# Patient Record
Sex: MALE | Race: BLACK OR AFRICAN AMERICAN | NOT HISPANIC OR LATINO
[De-identification: names, ages, dates, MRNs, and addresses within clinical notes are randomized per-mention and may not be internally consistent; named-entity substitution may affect disease eponyms.]

---

## 2021-05-24 ENCOUNTER — NON-APPOINTMENT (OUTPATIENT)
Age: 66
End: 2021-05-24

## 2021-05-24 ENCOUNTER — APPOINTMENT (OUTPATIENT)
Dept: OPHTHALMOLOGY | Facility: CLINIC | Age: 66
End: 2021-05-24
Payer: MEDICARE

## 2021-05-24 PROBLEM — Z00.00 ENCOUNTER FOR PREVENTIVE HEALTH EXAMINATION: Status: ACTIVE | Noted: 2021-05-24

## 2021-05-24 PROCEDURE — 99072 ADDL SUPL MATRL&STAF TM PHE: CPT

## 2021-05-24 PROCEDURE — 76514 ECHO EXAM OF EYE THICKNESS: CPT

## 2021-05-24 PROCEDURE — 92250 FUNDUS PHOTOGRAPHY W/I&R: CPT

## 2021-05-24 PROCEDURE — 92004 COMPRE OPH EXAM NEW PT 1/>: CPT

## 2021-06-14 ENCOUNTER — NON-APPOINTMENT (OUTPATIENT)
Age: 66
End: 2021-06-14

## 2021-06-14 ENCOUNTER — APPOINTMENT (OUTPATIENT)
Dept: OPHTHALMOLOGY | Facility: CLINIC | Age: 66
End: 2021-06-14
Payer: MEDICARE

## 2021-06-14 PROCEDURE — 92083 EXTENDED VISUAL FIELD XM: CPT

## 2021-06-14 PROCEDURE — 99072 ADDL SUPL MATRL&STAF TM PHE: CPT

## 2021-06-14 PROCEDURE — 92012 INTRM OPH EXAM EST PATIENT: CPT

## 2021-07-26 ENCOUNTER — APPOINTMENT (OUTPATIENT)
Dept: OPHTHALMOLOGY | Facility: CLINIC | Age: 66
End: 2021-07-26
Payer: MEDICARE

## 2021-07-26 ENCOUNTER — NON-APPOINTMENT (OUTPATIENT)
Age: 66
End: 2021-07-26

## 2021-07-26 PROCEDURE — 92012 INTRM OPH EXAM EST PATIENT: CPT

## 2021-07-26 PROCEDURE — 92134 CPTRZ OPH DX IMG PST SGM RTA: CPT

## 2021-10-11 ENCOUNTER — NON-APPOINTMENT (OUTPATIENT)
Age: 66
End: 2021-10-11

## 2021-10-11 ENCOUNTER — APPOINTMENT (OUTPATIENT)
Dept: OPHTHALMOLOGY | Facility: CLINIC | Age: 66
End: 2021-10-11
Payer: MEDICARE

## 2021-10-11 PROCEDURE — 92012 INTRM OPH EXAM EST PATIENT: CPT

## 2021-10-11 PROCEDURE — 92083 EXTENDED VISUAL FIELD XM: CPT

## 2021-10-11 PROCEDURE — 92133 CPTRZD OPH DX IMG PST SGM ON: CPT

## 2021-11-15 ENCOUNTER — NON-APPOINTMENT (OUTPATIENT)
Age: 66
End: 2021-11-15

## 2021-11-15 ENCOUNTER — APPOINTMENT (OUTPATIENT)
Dept: OPHTHALMOLOGY | Facility: CLINIC | Age: 66
End: 2021-11-15
Payer: MEDICARE

## 2021-11-15 PROCEDURE — 92012 INTRM OPH EXAM EST PATIENT: CPT

## 2021-11-29 ENCOUNTER — TRANSCRIPTION ENCOUNTER (OUTPATIENT)
Age: 66
End: 2021-11-29

## 2021-11-30 ENCOUNTER — APPOINTMENT (OUTPATIENT)
Dept: OPHTHALMOLOGY | Facility: AMBULATORY SURGERY CENTER | Age: 66
End: 2021-11-30

## 2021-11-30 ENCOUNTER — NON-APPOINTMENT (OUTPATIENT)
Age: 66
End: 2021-11-30

## 2021-11-30 ENCOUNTER — OUTPATIENT (OUTPATIENT)
Dept: OUTPATIENT SERVICES | Facility: HOSPITAL | Age: 66
LOS: 1 days | Discharge: HOME | End: 2021-11-30
Payer: MEDICARE

## 2021-11-30 PROCEDURE — 66170 GLAUCOMA SURGERY: CPT

## 2021-12-01 ENCOUNTER — NON-APPOINTMENT (OUTPATIENT)
Age: 66
End: 2021-12-01

## 2021-12-01 ENCOUNTER — APPOINTMENT (OUTPATIENT)
Dept: OPHTHALMOLOGY | Facility: CLINIC | Age: 66
End: 2021-12-01
Payer: MEDICARE

## 2021-12-01 PROCEDURE — 99024 POSTOP FOLLOW-UP VISIT: CPT

## 2021-12-06 ENCOUNTER — APPOINTMENT (OUTPATIENT)
Dept: OPHTHALMOLOGY | Facility: CLINIC | Age: 66
End: 2021-12-06
Payer: MEDICARE

## 2021-12-06 ENCOUNTER — NON-APPOINTMENT (OUTPATIENT)
Age: 66
End: 2021-12-06

## 2021-12-06 PROCEDURE — 99024 POSTOP FOLLOW-UP VISIT: CPT

## 2021-12-13 ENCOUNTER — APPOINTMENT (OUTPATIENT)
Dept: OPHTHALMOLOGY | Facility: CLINIC | Age: 66
End: 2021-12-13
Payer: MEDICARE

## 2021-12-13 ENCOUNTER — NON-APPOINTMENT (OUTPATIENT)
Age: 66
End: 2021-12-13

## 2021-12-13 PROCEDURE — 99024 POSTOP FOLLOW-UP VISIT: CPT

## 2021-12-20 ENCOUNTER — NON-APPOINTMENT (OUTPATIENT)
Age: 66
End: 2021-12-20

## 2021-12-20 ENCOUNTER — APPOINTMENT (OUTPATIENT)
Dept: OPHTHALMOLOGY | Facility: CLINIC | Age: 66
End: 2021-12-20
Payer: MEDICARE

## 2021-12-20 PROCEDURE — 99024 POSTOP FOLLOW-UP VISIT: CPT

## 2022-01-03 ENCOUNTER — NON-APPOINTMENT (OUTPATIENT)
Age: 67
End: 2022-01-03

## 2022-01-03 ENCOUNTER — APPOINTMENT (OUTPATIENT)
Dept: OPHTHALMOLOGY | Facility: CLINIC | Age: 67
End: 2022-01-03
Payer: MEDICARE

## 2022-01-03 PROCEDURE — 99024 POSTOP FOLLOW-UP VISIT: CPT

## 2022-01-24 ENCOUNTER — NON-APPOINTMENT (OUTPATIENT)
Age: 67
End: 2022-01-24

## 2022-01-24 ENCOUNTER — APPOINTMENT (OUTPATIENT)
Dept: OPHTHALMOLOGY | Facility: CLINIC | Age: 67
End: 2022-01-24
Payer: MEDICARE

## 2022-01-24 PROCEDURE — 99024 POSTOP FOLLOW-UP VISIT: CPT

## 2022-02-14 ENCOUNTER — NON-APPOINTMENT (OUTPATIENT)
Age: 67
End: 2022-02-14

## 2022-02-14 ENCOUNTER — APPOINTMENT (OUTPATIENT)
Dept: OPHTHALMOLOGY | Facility: CLINIC | Age: 67
End: 2022-02-14
Payer: MEDICARE

## 2022-02-14 PROCEDURE — 99024 POSTOP FOLLOW-UP VISIT: CPT

## 2022-05-16 ENCOUNTER — APPOINTMENT (OUTPATIENT)
Dept: OPHTHALMOLOGY | Facility: CLINIC | Age: 67
End: 2022-05-16
Payer: MEDICARE

## 2022-05-16 ENCOUNTER — NON-APPOINTMENT (OUTPATIENT)
Age: 67
End: 2022-05-16

## 2022-05-16 PROCEDURE — 92012 INTRM OPH EXAM EST PATIENT: CPT

## 2022-05-16 PROCEDURE — 92083 EXTENDED VISUAL FIELD XM: CPT

## 2022-05-17 NOTE — ASU PATIENT PROFILE, ADULT - PATIENT REPRESENTATIVE: ( YOU CAN CHOOSE ANY PERSON THAT CAN ASSIST YOU WITH YOUR HEALTH CARE PREFERENCES, DOES NOT HAVE TO BE A SPOUSE, IMMEDIATE FAMILY OR SIGNIFICANT OTHER/PARTNER)
Pt arrives in care of parents for n/v. Onset approx 2200 tonight. Pt sleeping on arrival to triage however awakened during process dry heaving. Mother denies diarrhea or fever. Mother attempted tums without relief. Declines

## 2022-05-17 NOTE — ASU PATIENT PROFILE, ADULT - NSICDXPASTSURGICALHX_GEN_ALL_CORE_FT
PAST SURGICAL HISTORY:  H/O resection of stomach      PAST SURGICAL HISTORY:  Cataract, right eye     H/O resection of stomach     History of trabeculectomy left eye

## 2022-05-17 NOTE — ASU PATIENT PROFILE, ADULT - NS PREOP UNDERSTANDS INFO
2 slices of dry toast before 6am, water or clear apple juice until 11:15am, Patient verbalized understanding.

## 2022-05-17 NOTE — ASU PATIENT PROFILE, ADULT - NSICDXPASTMEDICALHX_GEN_ALL_CORE_FT
PAST MEDICAL HISTORY:  H pylori ulcer     HTN (hypertension)     Hyperlipidemia      PAST MEDICAL HISTORY:  Enlarged prostate     H pylori ulcer     HTN (hypertension)     Hyperlipidemia

## 2022-05-18 ENCOUNTER — OUTPATIENT (OUTPATIENT)
Dept: OUTPATIENT SERVICES | Facility: HOSPITAL | Age: 67
LOS: 1 days | Discharge: HOME | End: 2022-05-18
Payer: MEDICARE

## 2022-05-18 ENCOUNTER — APPOINTMENT (OUTPATIENT)
Dept: OPHTHALMOLOGY | Facility: AMBULATORY SURGERY CENTER | Age: 67
End: 2022-05-18

## 2022-05-18 ENCOUNTER — NON-APPOINTMENT (OUTPATIENT)
Age: 67
End: 2022-05-18

## 2022-05-18 VITALS
HEART RATE: 57 BPM | HEIGHT: 66 IN | SYSTOLIC BLOOD PRESSURE: 138 MMHG | WEIGHT: 160.06 LBS | DIASTOLIC BLOOD PRESSURE: 91 MMHG | OXYGEN SATURATION: 100 % | TEMPERATURE: 98 F | RESPIRATION RATE: 16 BRPM

## 2022-05-18 VITALS
HEART RATE: 57 BPM | RESPIRATION RATE: 18 BRPM | OXYGEN SATURATION: 98 % | SYSTOLIC BLOOD PRESSURE: 134 MMHG | DIASTOLIC BLOOD PRESSURE: 82 MMHG

## 2022-05-18 DIAGNOSIS — Z98.890 OTHER SPECIFIED POSTPROCEDURAL STATES: Chronic | ICD-10-CM

## 2022-05-18 DIAGNOSIS — H26.9 UNSPECIFIED CATARACT: Chronic | ICD-10-CM

## 2022-05-18 DIAGNOSIS — Z90.3 ACQUIRED ABSENCE OF STOMACH [PART OF]: Chronic | ICD-10-CM

## 2022-05-18 PROCEDURE — 66250 FOLLOW-UP SURGERY OF EYE: CPT | Mod: LT

## 2022-05-18 RX ORDER — ACETAMINOPHEN 500 MG
650 TABLET ORAL ONCE
Refills: 0 | Status: DISCONTINUED | OUTPATIENT
Start: 2022-05-18 | End: 2022-05-18

## 2022-05-18 RX ORDER — ONDANSETRON 8 MG/1
4 TABLET, FILM COATED ORAL ONCE
Refills: 0 | Status: DISCONTINUED | OUTPATIENT
Start: 2022-05-18 | End: 2022-05-18

## 2022-05-18 RX ORDER — AMLODIPINE BESYLATE 2.5 MG/1
1 TABLET ORAL
Qty: 0 | Refills: 0 | DISCHARGE

## 2022-05-18 RX ORDER — SODIUM CHLORIDE 9 MG/ML
1000 INJECTION, SOLUTION INTRAVENOUS
Refills: 0 | Status: DISCONTINUED | OUTPATIENT
Start: 2022-05-18 | End: 2022-05-18

## 2022-05-18 RX ORDER — OFLOXACIN 0.3 %
1 DROPS OPHTHALMIC (EYE)
Refills: 0 | Status: COMPLETED | OUTPATIENT
Start: 2022-05-18 | End: 2022-05-18

## 2022-05-18 RX ADMIN — Medication 1 DROP(S): at 14:05

## 2022-05-18 RX ADMIN — Medication 1 DROP(S): at 14:10

## 2022-05-18 RX ADMIN — Medication 1 DROP(S): at 14:00

## 2022-05-18 NOTE — OPERATIVE REPORT - OPERATIVE RPOSRT DETAILS
DATE OF SURGERY: 05/18/2022    OPERATING SURGEON: BEATRIZ THOMPSON D.O.     ASSISTANT SURGEON: NONE     ANESTHESIA: MONITORED ANESTHESIA CARE    PREOPERATIVE DIAGNOSIS: SEVERE PRIMARY OPEN ANGLE GLAUCOMA, LEFT EYE    POSTOPERATIVE DIAGNOSIS: SEVERE PRIMARY OPEN ANGLE GLAUCOMA, LEFT EYE    OPERATIVE PROCEDURE: TRABECULECTOMY REVISION WITH MITOMYCIN C, LEFT EYE    COMPLICATIONS: NONE.     SPECIMENS: NONE.    ESTIMATED BLOOD LOSS: <1 cc    PATIENT CONDITION: STABLE.    PROCEDURE:  Prior to the procedure, all risks, benefits and alternatives were discussed with the patient, including but not limited to infection, bleeding, retinal detachment, increase or decrease in intraocular pressure, corneal edema, corneal decompensation, ptosis, diplopia, loss of vision, no improvement of vision, need for second surgery, macular edema, intraocular inflammation, etc. All questions were answered and the patient wished to proceed with the surgery. Informed consent was obtained.    The patient was wheeled to the operating room and placed on the operating table in a supine position. Next, the left eye was prepped and draped in the usual sterile fashion for intraocular surgery. An eyelid speculum was placed into the left eye.    A 7-0 silk traction suture was placed through the cornea and the eye was rotated inferiorly. Subconjunctival and subtenon 2% PF Lidocaine and Mitomycin-C 30 micrograms were then injected into the superior quadrant. 1-mm Side port blade was used to create a paracenthesis temporally. Next, MVR blade and 27 gauge needle were used for transconjunctival needle revision from superonasal approach. The 'ring of steel' was penetrated and adhesions broken. The needle was then advanced underneath the scleral flap into the ostium. Increased flow was noted. BSS was injected into the anterior chamber to raise the intraocular pressure. Single 8-0 vicryl suture was then used to close the conjunctiva. The intraocular pressure was manually palpated and found to be around 10mmHg.     At the cessation of the procedure, the anterior chamber was well formed with a diffuse filtering bleb superiorly. All wounds were inspected meticulously and found to be watertight. Antibiotic and dexamethasone were applied on the eye. Eyelid speculum was removed. Topical antibiotic and steroid ointment was placed onto the left eye, which was then patched and shielded. The patient left the operating room in an excellent condition.

## 2022-05-19 ENCOUNTER — NON-APPOINTMENT (OUTPATIENT)
Age: 67
End: 2022-05-19

## 2022-05-19 ENCOUNTER — APPOINTMENT (OUTPATIENT)
Dept: OPHTHALMOLOGY | Facility: CLINIC | Age: 67
End: 2022-05-19
Payer: MEDICARE

## 2022-05-19 ENCOUNTER — TRANSCRIPTION ENCOUNTER (OUTPATIENT)
Age: 67
End: 2022-05-19

## 2022-05-19 PROBLEM — K27.9 PEPTIC ULCER, SITE UNSPECIFIED, UNSPECIFIED AS ACUTE OR CHRONIC, WITHOUT HEMORRHAGE OR PERFORATION: Chronic | Status: ACTIVE | Noted: 2022-05-17

## 2022-05-19 PROBLEM — N40.0 BENIGN PROSTATIC HYPERPLASIA WITHOUT LOWER URINARY TRACT SYMPTOMS: Chronic | Status: ACTIVE | Noted: 2022-05-17

## 2022-05-19 PROBLEM — I10 ESSENTIAL (PRIMARY) HYPERTENSION: Chronic | Status: ACTIVE | Noted: 2022-05-17

## 2022-05-19 PROBLEM — E78.5 HYPERLIPIDEMIA, UNSPECIFIED: Chronic | Status: ACTIVE | Noted: 2022-05-17

## 2022-05-19 PROCEDURE — 99024 POSTOP FOLLOW-UP VISIT: CPT

## 2022-05-23 ENCOUNTER — NON-APPOINTMENT (OUTPATIENT)
Age: 67
End: 2022-05-23

## 2022-05-23 ENCOUNTER — APPOINTMENT (OUTPATIENT)
Dept: OPHTHALMOLOGY | Facility: CLINIC | Age: 67
End: 2022-05-23
Payer: MEDICARE

## 2022-05-23 PROCEDURE — 99024 POSTOP FOLLOW-UP VISIT: CPT

## 2022-05-31 ENCOUNTER — APPOINTMENT (OUTPATIENT)
Dept: OPHTHALMOLOGY | Facility: CLINIC | Age: 67
End: 2022-05-31
Payer: MEDICARE

## 2022-05-31 ENCOUNTER — NON-APPOINTMENT (OUTPATIENT)
Age: 67
End: 2022-05-31

## 2022-05-31 PROCEDURE — 68200 TREAT EYELID BY INJECTION: CPT | Mod: 58,LT

## 2022-05-31 PROCEDURE — 99024 POSTOP FOLLOW-UP VISIT: CPT

## 2022-06-07 ENCOUNTER — NON-APPOINTMENT (OUTPATIENT)
Age: 67
End: 2022-06-07

## 2022-06-07 ENCOUNTER — APPOINTMENT (OUTPATIENT)
Dept: OPHTHALMOLOGY | Facility: CLINIC | Age: 67
End: 2022-06-07
Payer: MEDICARE

## 2022-06-07 PROCEDURE — 99024 POSTOP FOLLOW-UP VISIT: CPT

## 2022-06-13 ENCOUNTER — NON-APPOINTMENT (OUTPATIENT)
Age: 67
End: 2022-06-13

## 2022-06-13 ENCOUNTER — APPOINTMENT (OUTPATIENT)
Dept: OPHTHALMOLOGY | Facility: CLINIC | Age: 67
End: 2022-06-13
Payer: MEDICARE

## 2022-06-13 PROCEDURE — 99024 POSTOP FOLLOW-UP VISIT: CPT

## 2022-06-27 ENCOUNTER — NON-APPOINTMENT (OUTPATIENT)
Age: 67
End: 2022-06-27

## 2022-06-27 ENCOUNTER — APPOINTMENT (OUTPATIENT)
Dept: OPHTHALMOLOGY | Facility: CLINIC | Age: 67
End: 2022-06-27

## 2022-06-27 PROCEDURE — 99024 POSTOP FOLLOW-UP VISIT: CPT

## 2022-06-27 PROCEDURE — 68200 TREAT EYELID BY INJECTION: CPT | Mod: 58,LT

## 2022-07-18 ENCOUNTER — NON-APPOINTMENT (OUTPATIENT)
Age: 67
End: 2022-07-18

## 2022-07-18 ENCOUNTER — APPOINTMENT (OUTPATIENT)
Dept: OPHTHALMOLOGY | Facility: CLINIC | Age: 67
End: 2022-07-18

## 2022-07-18 PROCEDURE — 99024 POSTOP FOLLOW-UP VISIT: CPT

## 2022-08-01 ENCOUNTER — NON-APPOINTMENT (OUTPATIENT)
Age: 67
End: 2022-08-01

## 2022-08-01 ENCOUNTER — APPOINTMENT (OUTPATIENT)
Dept: OPHTHALMOLOGY | Facility: CLINIC | Age: 67
End: 2022-08-01

## 2022-08-01 PROCEDURE — 99024 POSTOP FOLLOW-UP VISIT: CPT

## 2022-08-29 NOTE — ASU PATIENT PROFILE, ADULT - NSICDXPASTSURGICALHX_GEN_ALL_CORE_FT
PAST SURGICAL HISTORY:  Cataract, right eye     H/O resection of stomach     History of trabeculectomy left eye

## 2022-08-29 NOTE — ASU PATIENT PROFILE, ADULT - FALL HARM RISK - UNIVERSAL INTERVENTIONS
Bed in lowest position, wheels locked, appropriate side rails in place/Call bell, personal items and telephone in reach/Instruct patient to call for assistance before getting out of bed or chair/Non-slip footwear when patient is out of bed/[place]i to call system/Physically safe environment - no spills, clutter or unnecessary equipment/Purposeful Proactive Rounding/Room/bathroom lighting operational, light cord in reach

## 2022-08-29 NOTE — ASU PATIENT PROFILE, ADULT - NSICDXPASTMEDICALHX_GEN_ALL_CORE_FT
PAST MEDICAL HISTORY:  Enlarged prostate     H pylori ulcer     HTN (hypertension)     Hyperlipidemia

## 2022-08-29 NOTE — ASU PATIENT PROFILE, ADULT - NS PREOP UNDERSTANDS INFO
NPO after 12Mn escort arranged, bring ID photo , insurance and vaccination cards, address and telephone given to patient./yes

## 2022-08-30 ENCOUNTER — APPOINTMENT (OUTPATIENT)
Dept: OPHTHALMOLOGY | Facility: AMBULATORY SURGERY CENTER | Age: 67
End: 2022-08-30

## 2022-08-31 ENCOUNTER — APPOINTMENT (OUTPATIENT)
Dept: OPHTHALMOLOGY | Facility: CLINIC | Age: 67
End: 2022-08-31

## 2022-09-13 ENCOUNTER — TRANSCRIPTION ENCOUNTER (OUTPATIENT)
Age: 67
End: 2022-09-13

## 2022-09-13 ENCOUNTER — NON-APPOINTMENT (OUTPATIENT)
Age: 67
End: 2022-09-13

## 2022-09-13 ENCOUNTER — OUTPATIENT (OUTPATIENT)
Dept: OUTPATIENT SERVICES | Facility: HOSPITAL | Age: 67
LOS: 1 days | Discharge: HOME | End: 2022-09-13

## 2022-09-13 ENCOUNTER — APPOINTMENT (OUTPATIENT)
Dept: OPHTHALMOLOGY | Facility: AMBULATORY SURGERY CENTER | Age: 67
End: 2022-09-13

## 2022-09-13 VITALS
RESPIRATION RATE: 16 BRPM | TEMPERATURE: 97 F | DIASTOLIC BLOOD PRESSURE: 76 MMHG | OXYGEN SATURATION: 99 % | SYSTOLIC BLOOD PRESSURE: 137 MMHG | HEART RATE: 61 BPM

## 2022-09-13 VITALS
WEIGHT: 158.07 LBS | TEMPERATURE: 97 F | DIASTOLIC BLOOD PRESSURE: 90 MMHG | RESPIRATION RATE: 16 BRPM | HEART RATE: 76 BPM | HEIGHT: 66 IN | SYSTOLIC BLOOD PRESSURE: 144 MMHG | OXYGEN SATURATION: 99 %

## 2022-09-13 DIAGNOSIS — H26.9 UNSPECIFIED CATARACT: Chronic | ICD-10-CM

## 2022-09-13 DIAGNOSIS — Z98.890 OTHER SPECIFIED POSTPROCEDURAL STATES: Chronic | ICD-10-CM

## 2022-09-13 DIAGNOSIS — Z90.3 ACQUIRED ABSENCE OF STOMACH [PART OF]: Chronic | ICD-10-CM

## 2022-09-13 PROCEDURE — 66180 AQUEOUS SHUNT EYE W/GRAFT: CPT | Mod: LT

## 2022-09-13 DEVICE — SHUNT GLAUCOMA BAERVELDT: Type: IMPLANTABLE DEVICE | Site: LEFT | Status: FUNCTIONAL

## 2022-09-13 RX ORDER — PITAVASTATIN CALCIUM 1.04 MG/1
1 TABLET, FILM COATED ORAL
Qty: 0 | Refills: 0 | DISCHARGE

## 2022-09-13 RX ORDER — SODIUM CHLORIDE 9 MG/ML
1000 INJECTION, SOLUTION INTRAVENOUS
Refills: 0 | Status: DISCONTINUED | OUTPATIENT
Start: 2022-09-13 | End: 2022-09-13

## 2022-09-13 RX ORDER — LOSARTAN POTASSIUM 100 MG/1
1 TABLET, FILM COATED ORAL
Qty: 0 | Refills: 0 | DISCHARGE

## 2022-09-13 RX ORDER — OFLOXACIN 0.3 %
1 DROPS OPHTHALMIC (EYE)
Refills: 0 | Status: COMPLETED | OUTPATIENT
Start: 2022-09-13 | End: 2022-09-13

## 2022-09-13 RX ORDER — OFLOXACIN 0.3 %
1 DROPS OPHTHALMIC (EYE)
Refills: 0 | Status: DISCONTINUED | OUTPATIENT
Start: 2022-09-13 | End: 2022-09-13

## 2022-09-13 RX ORDER — CHOLECALCIFEROL (VITAMIN D3) 125 MCG
1 CAPSULE ORAL
Qty: 0 | Refills: 0 | DISCHARGE

## 2022-09-13 RX ORDER — FINASTERIDE 5 MG/1
1 TABLET, FILM COATED ORAL
Qty: 0 | Refills: 0 | DISCHARGE

## 2022-09-13 RX ADMIN — Medication 1 DROP(S): at 13:01

## 2022-09-13 RX ADMIN — Medication 1 DROP(S): at 12:53

## 2022-09-13 RX ADMIN — Medication 1 DROP(S): at 13:14

## 2022-09-13 NOTE — OPERATIVE REPORT - OPERATIVE RPOSRT DETAILS
DATE OF SURGERY:     OPERATING SURGEON: BEATRIZ THOMPSON D.O.     ASSISTANT SURGEON:     ANESTHESIA: MONITORED ANESTHESIA CARE AND SUBCONJUNCTIVAL INJECTION.    PREOPERATIVE DIAGNOSIS: GLAUCOMA, LEFT EYE    POSTOPERATIVE DIAGNOSIS: GLAUCOMA, LEFT EYE    OPERATIVE PROCEDURE: BAERVELDT GLAUCOMA IMPLANT, CORNEAL PATCH GRAFT, LEFT EYE.    COMPLICATIONS: NONE.    SPECIMEN: NONE.    ESTIMATED BLOOD LOSS: <1 cc    PATIENT CONDITION: STABLE.    PROCEDURE:   Prior to the procedure, all risks, benefits and alternatives were discussed with the patient, including but not limited to infection, bleeding, retinal detachment, increase or decrease in intraocular pressure, corneal edema, corneal decompensation, ptosis, diplopia, loss of vision, no improvement of vision, need for second surgery, macular edema, intraocular inflammation, etc. All questions were answered and the patient wished to proceed with the surgery. Informed consent was obtained.    The patient was wheeled to the operating room and placed on the operating table in a supine position. Next, the left eye was prepped and draped in the usual sterile fashion for intraocular surgery. An eyelid speculum was placed into the left eye.    A 7-0 silk traction suture was placed through the cornea and the eye was rotated superiorly. Subconjunctival and subtenon lidocaine was then injected into the inferonasal quadrant. An inferonasal peritomy was then created with Deborah scissors and clot forceps. The area was carefully dissected posteriorly. A Baerveldt glaucoma implant 350 was primed with balanced salt solution. One 3-0 Prolene suture and one 6-0 Prolene suture were inserted as rip cords into the proximal opening of the tube. The tube was ligated tightly together with the two rip cord sutures, using two 7-0 Vicryl sutures. Inferior and nasal rectus muscles were isolated using a muscle hook and a cotton-tipped applicator. The plate was then placed 8 mm posterior to the limbus and sutured down with two interrupted 8-0 Nylon sutures.    The tube was then trimmed to an appropriate length. Using a 23 gauge needle, a sclerostomy was created with an external opening at 2 mm posterior to the limbus. The tube was then placed into the anterior chamber through the sclerostomy. It was noted to be in a good position. The tube was then sutured to the sclera using three interrupted 9-0 Nylon sutures. A corneal patch graft was sutured over the tube with two interrupted 8-0 Vicryl sutures. A 10-0 Vicryl suture was passed across the tube for intraocular pressure control during the early postoperative period. After appropriate conjunctivoplasty, the conjunctiva was then reapproximated with interrupted 8-0 Vicryl sutures. Mitomycin C 10 micrograms was injected into the Tenon’s capsule over the tube plate.    At the end of the procedure, the anterior chamber was well formed. The intraocular pressure was in the mid-teens by palpation. The tube was in a good position and the conjunctival wound was water tight. Antibiotic and dexamethasone were applied on the cornea and conjunctiva. The eyelid speculum was removed. Topical antibiotic and steroid ointment was placed onto the left eye, which was then patched and shielded. The patient was wheeled to the recovery room in a stable and excellent condition.   DATE OF SURGERY: 09/13/2022    OPERATING SURGEON: BEATRIZ THOMPSON D.O.     ASSISTANT SURGEON: NONE    ANESTHESIA: MONITORED ANESTHESIA CARE AND SUBCONJUNCTIVAL INJECTION.    PREOPERATIVE DIAGNOSIS: SEVERE PRIMARY OPEN-ANGLE GLAUCOMA, LEFT EYE    POSTOPERATIVE DIAGNOSIS: SEVERE PRIMARY OPEN-ANGLE GLAUCOMA, LEFT EYE    OPERATIVE PROCEDURE: BAERVELDT GLAUCOMA IMPLANT, CORNEAL PATCH GRAFT, LEFT EYE.    COMPLICATIONS: NONE.    SPECIMEN: NONE.    ESTIMATED BLOOD LOSS: <1 cc    PATIENT CONDITION: STABLE.    PROCEDURE:   Prior to the procedure, all risks, benefits and alternatives were discussed with the patient, including but not limited to infection, bleeding, retinal detachment, increase or decrease in intraocular pressure, corneal edema, corneal decompensation, ptosis, diplopia, loss of vision, no improvement of vision, need for second surgery, macular edema, intraocular inflammation, etc. All questions were answered and the patient wished to proceed with the surgery. Informed consent was obtained.    The patient was wheeled to the operating room and placed on the operating table in a supine position. Next, the left eye was prepped and draped in the usual sterile fashion for intraocular surgery. An eyelid speculum was placed into the left eye.    A 7-0 silk traction suture was placed through the cornea and the eye was rotated superiorly. Subconjunctival and subtenon lidocaine was then injected into the inferonasal quadrant. An inferonasal peritomy was then created with Deborah scissors and clot forceps. The area was carefully dissected posteriorly. A Baerveldt glaucoma implant 350 was primed with balanced salt solution. One 3-0 Prolene suture and one 6-0 Prolene suture were inserted as rip cords into the proximal opening of the tube. The tube was ligated tightly together with the two rip cord sutures, using two 7-0 Vicryl sutures. Inferior and nasal rectus muscles were isolated using a muscle hook and a cotton-tipped applicator. The plate was then placed 8 mm posterior to the limbus and sutured down with two interrupted 8-0 Nylon sutures.    The tube was then trimmed to an appropriate length. Using a 23 gauge needle, a sclerostomy was created with an external opening at 2 mm posterior to the limbus. The tube was then placed into the anterior chamber through the sclerostomy. It was noted to be in a good position. The tube was then sutured to the sclera using three interrupted 9-0 Nylon sutures. A corneal patch graft was sutured over the tube with two interrupted 8-0 Vicryl sutures. After appropriate conjunctivoplasty, the conjunctiva was then reapproximated with interrupted 8-0 Vicryl sutures.     At the end of the procedure, the anterior chamber was well formed. The intraocular pressure was in the mid-teens by palpation. The tube was in a good position and the conjunctival wound was water tight. Antibiotic and dexamethasone were applied on the cornea and conjunctiva. The eyelid speculum was removed. Topical antibiotic and steroid ointment was placed onto the left eye, which was then patched and shielded. The patient was wheeled to the recovery room in a stable and excellent condition.

## 2022-09-13 NOTE — ASU DISCHARGE PLAN (ADULT/PEDIATRIC) - NS MD DC FALL RISK RISK
For information on Fall & Injury Prevention, visit: [email or web address] OR  [email or web address] OR  https://www.cdc.gov/steadi/patient.html

## 2022-09-14 ENCOUNTER — APPOINTMENT (OUTPATIENT)
Dept: OPHTHALMOLOGY | Facility: CLINIC | Age: 67
End: 2022-09-14

## 2022-09-14 ENCOUNTER — NON-APPOINTMENT (OUTPATIENT)
Age: 67
End: 2022-09-14

## 2022-09-14 PROCEDURE — 99024 POSTOP FOLLOW-UP VISIT: CPT

## 2022-09-19 ENCOUNTER — NON-APPOINTMENT (OUTPATIENT)
Age: 67
End: 2022-09-19

## 2022-09-19 ENCOUNTER — APPOINTMENT (OUTPATIENT)
Dept: OPHTHALMOLOGY | Facility: CLINIC | Age: 67
End: 2022-09-19

## 2022-09-19 PROCEDURE — 99024 POSTOP FOLLOW-UP VISIT: CPT

## 2022-09-26 ENCOUNTER — APPOINTMENT (OUTPATIENT)
Dept: OPHTHALMOLOGY | Facility: CLINIC | Age: 67
End: 2022-09-26

## 2022-09-26 ENCOUNTER — NON-APPOINTMENT (OUTPATIENT)
Age: 67
End: 2022-09-26

## 2022-09-26 PROCEDURE — 99024 POSTOP FOLLOW-UP VISIT: CPT

## 2022-10-06 ENCOUNTER — APPOINTMENT (OUTPATIENT)
Dept: OPHTHALMOLOGY | Facility: CLINIC | Age: 67
End: 2022-10-06

## 2022-10-06 ENCOUNTER — NON-APPOINTMENT (OUTPATIENT)
Age: 67
End: 2022-10-06

## 2022-10-06 PROCEDURE — 99024 POSTOP FOLLOW-UP VISIT: CPT

## 2022-10-17 ENCOUNTER — APPOINTMENT (OUTPATIENT)
Dept: OPHTHALMOLOGY | Facility: CLINIC | Age: 67
End: 2022-10-17

## 2022-10-17 ENCOUNTER — NON-APPOINTMENT (OUTPATIENT)
Age: 67
End: 2022-10-17

## 2022-10-17 PROCEDURE — 99024 POSTOP FOLLOW-UP VISIT: CPT

## 2022-10-31 ENCOUNTER — APPOINTMENT (OUTPATIENT)
Dept: OPHTHALMOLOGY | Facility: CLINIC | Age: 67
End: 2022-10-31

## 2022-10-31 ENCOUNTER — NON-APPOINTMENT (OUTPATIENT)
Age: 67
End: 2022-10-31

## 2022-10-31 PROCEDURE — 99024 POSTOP FOLLOW-UP VISIT: CPT

## 2022-11-21 ENCOUNTER — APPOINTMENT (OUTPATIENT)
Dept: OPHTHALMOLOGY | Facility: CLINIC | Age: 67
End: 2022-11-21

## 2022-11-21 ENCOUNTER — NON-APPOINTMENT (OUTPATIENT)
Age: 67
End: 2022-11-21

## 2022-11-21 PROCEDURE — 99024 POSTOP FOLLOW-UP VISIT: CPT

## 2023-01-05 ENCOUNTER — APPOINTMENT (OUTPATIENT)
Dept: OPHTHALMOLOGY | Facility: CLINIC | Age: 68
End: 2023-01-05
Payer: MEDICARE

## 2023-01-05 ENCOUNTER — NON-APPOINTMENT (OUTPATIENT)
Age: 68
End: 2023-01-05

## 2023-01-05 PROCEDURE — 92012 INTRM OPH EXAM EST PATIENT: CPT

## 2023-04-06 ENCOUNTER — NON-APPOINTMENT (OUTPATIENT)
Age: 68
End: 2023-04-06

## 2023-04-06 ENCOUNTER — APPOINTMENT (OUTPATIENT)
Dept: OPHTHALMOLOGY | Facility: CLINIC | Age: 68
End: 2023-04-06
Payer: MEDICARE

## 2023-04-06 PROCEDURE — 92133 CPTRZD OPH DX IMG PST SGM ON: CPT

## 2023-04-06 PROCEDURE — 92012 INTRM OPH EXAM EST PATIENT: CPT

## 2023-04-06 PROCEDURE — 92083 EXTENDED VISUAL FIELD XM: CPT

## 2023-04-21 ENCOUNTER — NON-APPOINTMENT (OUTPATIENT)
Age: 68
End: 2023-04-21

## 2023-04-21 ENCOUNTER — APPOINTMENT (OUTPATIENT)
Dept: OPHTHALMOLOGY | Facility: CLINIC | Age: 68
End: 2023-04-21
Payer: MEDICARE

## 2023-04-21 PROCEDURE — 92012 INTRM OPH EXAM EST PATIENT: CPT

## 2023-06-19 NOTE — ASU PREOP CHECKLIST - NSBLOODTRANS_GEN_A_CORE_SIUH
[de-identified] : LEFT ELBOW\par skin intact. no swelling.\par mild TTP to lateral epicondyle.\par elbow ROM: good extension, flexion. good pronation, supination. \par no ulnar nerve subluxation. + Tinel’s at cubital tunnel.\par no pain with resisted wrist extension.\par \par LEFT HAND\par skin intact. no swelling.\par TTP to SL interval, thumb CMCJ, thumb MPJ > IPJ of all digits.\par good elbow extension, flexion. good pronation, supination.\par wrist ROM: good extension, flexion.\par good EPL, limited FPL. good finger extension, IF/MF flex 3-4cm to DPC, other fingers flex to loose fist. good finger abduction and adduction. \par numbness of tips of all digits, dorsal 1st webspace, dorsal ulnar hand.\par palpable radial pulse, brisk cap refill all digits.\par  deferred by patient, 1st DI 4/5, APB 4+/5 with pain.\par no triggering.\par + Tinel's at carpal tunnel. negative.\par no ulnar nerve subluxation at the elbow. + Tinel's at cubital tunnel.\par negative Spurling's test, but patient has limited ROM of neck.\par \par \par RIGHT ELBOW\par skin intact. no swelling.\par no TTP.\par good elbow extension, flexion. good pronation, limited supination. \par no ulnar nerve subluxation. + Tinel’s at cubital tunnel.\par no pain with resisted wrist extension.\par \par RIGHT HAND\par skin intact. no swelling.\par TTP to SL interval, thumb CMCJ, thumb MPJ > IPJ of all digits.\par good elbow extension, flexion. good pronation, limited supination.\par wrist ROM: good extension, flexion.\par good EPL, limited FPL. good finger extension, IF/MF flex 3-4cm to DPC, other fingers flex to loose fist. good finger abduction and adduction. \par numbness of tips of all digits, dorsal 1st webspace, dorsal ulnar hand.\par palpable radial pulse, brisk cap refill all digits.\par  deferred by patient, 1st DI 4/5, APB 4/5.\par no triggering.\par + Tinel's at carpal tunnel.\par no ulnar nerve subluxation at the elbow. + Tinel's at cubital tunnel.\par negative Spurling's test, but patient has limited ROM of neck.\par \par \par XRAYS OF LEFT ELBOW: no acute displaced fracture or dislocation.\par XRAYS OF RIGHT ELBOW: no acute displaced fracture or dislocation. small calcifications adjacent to medial epicondyle.\par XRAYS OF LEFT WRIST: no acute displaced fracture or dislocation. mild djd of thumb CMCJ.\par XRAYS OF RIGHT WRIST: no acute displaced fracture or dislocation. mild djd of thumb CMCJ. narrowing of STTJ. no...

## 2023-08-02 ENCOUNTER — APPOINTMENT (OUTPATIENT)
Dept: OPHTHALMOLOGY | Facility: CLINIC | Age: 68
End: 2023-08-02
Payer: MEDICARE

## 2023-08-02 ENCOUNTER — NON-APPOINTMENT (OUTPATIENT)
Age: 68
End: 2023-08-02

## 2023-08-02 PROCEDURE — 92012 INTRM OPH EXAM EST PATIENT: CPT

## 2023-08-02 PROCEDURE — 92134 CPTRZ OPH DX IMG PST SGM RTA: CPT

## 2023-08-11 ENCOUNTER — APPOINTMENT (OUTPATIENT)
Dept: OPHTHALMOLOGY | Facility: CLINIC | Age: 68
End: 2023-08-11
Payer: MEDICARE

## 2023-08-11 ENCOUNTER — NON-APPOINTMENT (OUTPATIENT)
Age: 68
End: 2023-08-11

## 2023-08-11 PROCEDURE — 92250 FUNDUS PHOTOGRAPHY W/I&R: CPT

## 2023-08-11 PROCEDURE — 67515 INJECT/TREAT EYE SOCKET: CPT | Mod: LT

## 2023-08-11 PROCEDURE — 92014 COMPRE OPH EXAM EST PT 1/>: CPT | Mod: 25

## 2023-09-06 ENCOUNTER — NON-APPOINTMENT (OUTPATIENT)
Age: 68
End: 2023-09-06

## 2023-09-06 ENCOUNTER — APPOINTMENT (OUTPATIENT)
Dept: OPHTHALMOLOGY | Facility: CLINIC | Age: 68
End: 2023-09-06
Payer: MEDICARE

## 2023-09-06 PROCEDURE — 92134 CPTRZ OPH DX IMG PST SGM RTA: CPT

## 2023-09-06 PROCEDURE — 92012 INTRM OPH EXAM EST PATIENT: CPT

## 2023-09-15 ENCOUNTER — APPOINTMENT (OUTPATIENT)
Dept: OPHTHALMOLOGY | Facility: CLINIC | Age: 68
End: 2023-09-15
Payer: MEDICARE

## 2023-09-15 ENCOUNTER — NON-APPOINTMENT (OUTPATIENT)
Age: 68
End: 2023-09-15

## 2023-09-15 PROCEDURE — 92134 CPTRZ OPH DX IMG PST SGM RTA: CPT

## 2023-09-15 PROCEDURE — 92012 INTRM OPH EXAM EST PATIENT: CPT

## 2023-10-20 ENCOUNTER — NON-APPOINTMENT (OUTPATIENT)
Age: 68
End: 2023-10-20

## 2023-10-20 ENCOUNTER — APPOINTMENT (OUTPATIENT)
Dept: OPHTHALMOLOGY | Facility: CLINIC | Age: 68
End: 2023-10-20
Payer: MEDICARE

## 2023-10-20 PROCEDURE — 92134 CPTRZ OPH DX IMG PST SGM RTA: CPT

## 2023-10-20 PROCEDURE — 92012 INTRM OPH EXAM EST PATIENT: CPT

## 2023-12-06 ENCOUNTER — NON-APPOINTMENT (OUTPATIENT)
Age: 68
End: 2023-12-06

## 2023-12-06 ENCOUNTER — APPOINTMENT (OUTPATIENT)
Dept: OPHTHALMOLOGY | Facility: CLINIC | Age: 68
End: 2023-12-06
Payer: MEDICARE

## 2023-12-06 PROCEDURE — 92083 EXTENDED VISUAL FIELD XM: CPT

## 2023-12-06 PROCEDURE — 92012 INTRM OPH EXAM EST PATIENT: CPT

## 2024-01-19 ENCOUNTER — APPOINTMENT (OUTPATIENT)
Dept: OPHTHALMOLOGY | Facility: CLINIC | Age: 69
End: 2024-01-19
Payer: MEDICARE

## 2024-01-19 ENCOUNTER — NON-APPOINTMENT (OUTPATIENT)
Age: 69
End: 2024-01-19

## 2024-01-19 PROCEDURE — 92012 INTRM OPH EXAM EST PATIENT: CPT

## 2024-01-19 PROCEDURE — 92134 CPTRZ OPH DX IMG PST SGM RTA: CPT

## 2024-01-22 NOTE — PRE-ANESTHESIA EVALUATION ADULT - NSATTENDATTESTRD_GEN_ALL_CORE
Addended by: CHAZ SANDOVAL on: 1/22/2024 02:30 PM     Modules accepted: Orders    
The patient has been re-examined and I agree with the above assessment or I updated with my findings.

## 2024-03-11 ENCOUNTER — APPOINTMENT (OUTPATIENT)
Dept: OPHTHALMOLOGY | Facility: CLINIC | Age: 69
End: 2024-03-11
Payer: MEDICARE

## 2024-03-11 ENCOUNTER — NON-APPOINTMENT (OUTPATIENT)
Age: 69
End: 2024-03-11

## 2024-03-11 PROCEDURE — 92133 CPTRZD OPH DX IMG PST SGM ON: CPT

## 2024-03-11 PROCEDURE — 92012 INTRM OPH EXAM EST PATIENT: CPT

## 2024-06-10 ENCOUNTER — NON-APPOINTMENT (OUTPATIENT)
Age: 69
End: 2024-06-10

## 2024-06-10 ENCOUNTER — APPOINTMENT (OUTPATIENT)
Dept: OPHTHALMOLOGY | Facility: CLINIC | Age: 69
End: 2024-06-10
Payer: MEDICARE

## 2024-06-10 PROCEDURE — 92012 INTRM OPH EXAM EST PATIENT: CPT

## 2024-06-10 PROCEDURE — 92083 EXTENDED VISUAL FIELD XM: CPT

## 2024-09-16 ENCOUNTER — APPOINTMENT (OUTPATIENT)
Dept: OPHTHALMOLOGY | Facility: CLINIC | Age: 69
End: 2024-09-16
Payer: MEDICARE

## 2024-09-16 ENCOUNTER — NON-APPOINTMENT (OUTPATIENT)
Age: 69
End: 2024-09-16

## 2024-09-16 PROCEDURE — 92014 COMPRE OPH EXAM EST PT 1/>: CPT

## 2024-09-16 PROCEDURE — 92250 FUNDUS PHOTOGRAPHY W/I&R: CPT

## 2024-10-17 ENCOUNTER — APPOINTMENT (OUTPATIENT)
Dept: UROLOGY | Facility: CLINIC | Age: 69
End: 2024-10-17
Payer: MEDICARE

## 2024-10-17 VITALS
HEIGHT: 67 IN | DIASTOLIC BLOOD PRESSURE: 101 MMHG | BODY MASS INDEX: 24.48 KG/M2 | SYSTOLIC BLOOD PRESSURE: 154 MMHG | OXYGEN SATURATION: 98 % | HEART RATE: 77 BPM | WEIGHT: 156 LBS | TEMPERATURE: 97.3 F

## 2024-10-17 PROCEDURE — 99203 OFFICE O/P NEW LOW 30 MIN: CPT

## 2024-10-28 DIAGNOSIS — R97.20 ELEVATED PROSTATE, SPECIFIC ANTIGEN [PSA]: ICD-10-CM

## 2024-11-05 ENCOUNTER — RESULT REVIEW (OUTPATIENT)
Age: 69
End: 2024-11-05

## 2024-11-05 ENCOUNTER — OUTPATIENT (OUTPATIENT)
Dept: OUTPATIENT SERVICES | Facility: HOSPITAL | Age: 69
LOS: 1 days | End: 2024-11-05

## 2024-11-05 ENCOUNTER — APPOINTMENT (OUTPATIENT)
Dept: MRI IMAGING | Facility: CLINIC | Age: 69
End: 2024-11-05
Payer: MEDICARE

## 2024-11-05 DIAGNOSIS — Z98.890 OTHER SPECIFIED POSTPROCEDURAL STATES: Chronic | ICD-10-CM

## 2024-11-05 DIAGNOSIS — Z90.3 ACQUIRED ABSENCE OF STOMACH [PART OF]: Chronic | ICD-10-CM

## 2024-11-05 DIAGNOSIS — H26.9 UNSPECIFIED CATARACT: Chronic | ICD-10-CM

## 2024-11-05 PROCEDURE — 76498P: CUSTOM | Mod: 26

## 2024-11-05 PROCEDURE — 72197 MRI PELVIS W/O & W/DYE: CPT | Mod: 26

## 2024-11-25 ENCOUNTER — NON-APPOINTMENT (OUTPATIENT)
Age: 69
End: 2024-11-25

## 2024-12-17 ENCOUNTER — APPOINTMENT (OUTPATIENT)
Dept: UROLOGY | Facility: CLINIC | Age: 69
End: 2024-12-17
Payer: MEDICARE

## 2024-12-17 VITALS
HEART RATE: 69 BPM | DIASTOLIC BLOOD PRESSURE: 98 MMHG | OXYGEN SATURATION: 98 % | TEMPERATURE: 97.8 F | SYSTOLIC BLOOD PRESSURE: 154 MMHG

## 2024-12-17 PROCEDURE — 99214 OFFICE O/P EST MOD 30 MIN: CPT

## 2024-12-20 ENCOUNTER — APPOINTMENT (OUTPATIENT)
Dept: OPHTHALMOLOGY | Facility: CLINIC | Age: 69
End: 2024-12-20
Payer: MEDICARE

## 2024-12-20 ENCOUNTER — NON-APPOINTMENT (OUTPATIENT)
Age: 69
End: 2024-12-20

## 2024-12-20 PROCEDURE — 92133 CPTRZD OPH DX IMG PST SGM ON: CPT

## 2024-12-20 PROCEDURE — 92083 EXTENDED VISUAL FIELD XM: CPT

## 2024-12-20 PROCEDURE — 92012 INTRM OPH EXAM EST PATIENT: CPT

## 2025-01-07 ENCOUNTER — TRANSCRIPTION ENCOUNTER (OUTPATIENT)
Age: 70
End: 2025-01-07

## 2025-01-28 ENCOUNTER — APPOINTMENT (OUTPATIENT)
Dept: UROLOGY | Facility: CLINIC | Age: 70
End: 2025-01-28
Payer: MEDICARE

## 2025-01-28 ENCOUNTER — NON-APPOINTMENT (OUTPATIENT)
Age: 70
End: 2025-01-28

## 2025-01-28 VITALS
SYSTOLIC BLOOD PRESSURE: 167 MMHG | DIASTOLIC BLOOD PRESSURE: 114 MMHG | TEMPERATURE: 97.6 F | HEART RATE: 61 BPM | OXYGEN SATURATION: 97 %

## 2025-01-28 DIAGNOSIS — R35.0 FREQUENCY OF MICTURITION: ICD-10-CM

## 2025-01-28 PROCEDURE — 99214 OFFICE O/P EST MOD 30 MIN: CPT

## 2025-01-30 LAB — BACTERIA UR CULT: NORMAL

## 2025-01-31 ENCOUNTER — NON-APPOINTMENT (OUTPATIENT)
Age: 70
End: 2025-01-31

## 2025-01-31 ENCOUNTER — APPOINTMENT (OUTPATIENT)
Dept: OPHTHALMOLOGY | Facility: CLINIC | Age: 70
End: 2025-01-31
Payer: MEDICARE

## 2025-01-31 PROCEDURE — 92012 INTRM OPH EXAM EST PATIENT: CPT

## 2025-02-03 NOTE — ASU PATIENT PROFILE, ADULT - PATIENT REPRESENTATIVE: ( YOU CAN CHOOSE ANY PERSON THAT CAN ASSIST YOU WITH YOUR HEALTH CARE PREFERENCES, DOES NOT HAVE TO BE A SPOUSE, IMMEDIATE FAMILY OR SIGNIFICANT OTHER/PARTNER)
HPI    Via  peng #[id number].  Joshua is a 6 y.o. male who is brought in by his mother to establish eye   care.   Today, mom states that both eyes stay watery  and red throughout the day   and when waking up he has a lot of yellowish-greenish mucous discharge.   She also states that when he is tired his vision get blurry and been   complaining of headaches. Denies any eye misalignment. Mom has noticed   that his lids are open during sleep    Eye meds: Patanol once daily or as needed    History obtained by parent/guardian accompanying patient at today's   appointment             Last edited by Milena Burnett MD on 2/3/2025  3:59 PM.        ROS    Positive for: Eyes  Negative for: Constitutional  Last edited by Milena Burnett MD on 2/3/2025  3:59 PM.        Assessment /Plan     For exam results, see Encounter Report.    Exposure keratopathy, bilateral  -     erythromycin (ROMYCIN) ophthalmic ointment; Place into both eyes every evening.  Dispense: 3.5 g; Refill: 6    Lagophthalmos of eyelids of both eyes, unspecified eyelid, unspecified lagophthalmos type      Educated mother about ocular findings   Joshua sleeps with his lids open (due to lagophthalmos) causing exposure drying.   Suggested Erythromycin ointment at night to help with his symptoms and ATs PRN during the day     RTC PRN, Have school or PCP screen vision.     This service was scribed by Wendy Hernandez for and in the presence of Dr. Burnett who personally performed this service.    Wendy Hernandez, COA    Milena Burnett MD                    
Declines

## 2025-02-21 NOTE — ASU PATIENT PROFILE, ADULT - AS SC BRADEN MOBILITY
Quality 226: Preventive Care And Screening: Tobacco Use: Screening And Cessation Intervention: Patient screened for tobacco use and is an ex/non-smoker Quality 110: Preventive Care And Screening: Influenza Immunization: Influenza Immunization Administered during Influenza season Quality 431: Preventive Care And Screening: Unhealthy Alcohol Use - Screening: Patient not identified as an unhealthy alcohol user when screened for unhealthy alcohol use using a systematic screening method Detail Level: Detailed Quality 130: Documentation Of Current Medications In The Medical Record: Current Medications Documented no (4) no limitation

## 2025-02-21 NOTE — ASU PATIENT PROFILE, ADULT - NSICDXPASTSURGICALHX_GEN_ALL_CORE_FT
PAST SURGICAL HISTORY:  Cataract, right eye     H/O resection of stomach     History of trabeculectomy left eye    Status post corneal transplant X2

## 2025-02-21 NOTE — ASU PATIENT PROFILE, ADULT - NS PREOP UNDERSTANDS INFO
No solid food/dairy/candy/gum after midnight Sunday; water allowed before 08:30am Monday; patient reminded to come with photo ID/insurance/credit card; dress in comfortable clothes; no jewelries/contact lens/valuable; no smoking/alcohol drinking/recreational drug use Sunday; escort to have photo ID; address and callback number was given/yes

## 2025-02-21 NOTE — ASU PATIENT PROFILE, ADULT - NSICDXPASTMEDICALHX_GEN_ALL_CORE_FT
PAST MEDICAL HISTORY:  Enlarged prostate     H pylori ulcer     HTN (hypertension)     Hyperlipidemia      PAST MEDICAL HISTORY:  Enlarged prostate     H pylori ulcer     HTN (hypertension)     Hyperlipidemia     Lung nodule     PUD (peptic ulcer disease)

## 2025-02-24 ENCOUNTER — OUTPATIENT (OUTPATIENT)
Dept: OUTPATIENT SERVICES | Facility: HOSPITAL | Age: 70
LOS: 1 days | Discharge: HOME | End: 2025-02-24

## 2025-02-24 ENCOUNTER — APPOINTMENT (OUTPATIENT)
Dept: OPHTHALMOLOGY | Facility: AMBULATORY SURGERY CENTER | Age: 70
End: 2025-02-24
Payer: MEDICARE

## 2025-02-24 ENCOUNTER — NON-APPOINTMENT (OUTPATIENT)
Age: 70
End: 2025-02-24

## 2025-02-24 VITALS
RESPIRATION RATE: 18 BRPM | TEMPERATURE: 98 F | HEART RATE: 60 BPM | WEIGHT: 151.24 LBS | HEIGHT: 66 IN | DIASTOLIC BLOOD PRESSURE: 91 MMHG | SYSTOLIC BLOOD PRESSURE: 147 MMHG | OXYGEN SATURATION: 100 %

## 2025-02-24 VITALS
OXYGEN SATURATION: 98 % | SYSTOLIC BLOOD PRESSURE: 122 MMHG | DIASTOLIC BLOOD PRESSURE: 78 MMHG | HEART RATE: 61 BPM | TEMPERATURE: 97 F | RESPIRATION RATE: 16 BRPM

## 2025-02-24 DIAGNOSIS — Z90.3 ACQUIRED ABSENCE OF STOMACH [PART OF]: Chronic | ICD-10-CM

## 2025-02-24 DIAGNOSIS — Z94.7 CORNEAL TRANSPLANT STATUS: Chronic | ICD-10-CM

## 2025-02-24 DIAGNOSIS — Z98.890 OTHER SPECIFIED POSTPROCEDURAL STATES: Chronic | ICD-10-CM

## 2025-02-24 DIAGNOSIS — H26.9 UNSPECIFIED CATARACT: Chronic | ICD-10-CM

## 2025-02-24 PROCEDURE — 66180 AQUEOUS SHUNT EYE W/GRAFT: CPT | Mod: LT

## 2025-02-24 DEVICE — SHUNT GLAUCOMA BAERVELDT: Type: IMPLANTABLE DEVICE | Site: LEFT | Status: FUNCTIONAL

## 2025-02-24 RX ORDER — SODIUM CHLORIDE 9 G/1000ML
1000 INJECTION, SOLUTION INTRAVENOUS
Refills: 0 | Status: DISCONTINUED | OUTPATIENT
Start: 2025-02-24 | End: 2025-02-24

## 2025-02-24 RX ORDER — OFLOXACIN 3 MG/ML
1 SOLUTION OPHTHALMIC
Refills: 0 | Status: COMPLETED | OUTPATIENT
Start: 2025-02-24 | End: 2025-02-24

## 2025-02-24 RX ORDER — FENTANYL CITRATE-0.9 % NACL/PF 100MCG/2ML
25 SYRINGE (ML) INTRAVENOUS
Refills: 0 | Status: DISCONTINUED | OUTPATIENT
Start: 2025-02-24 | End: 2025-02-24

## 2025-02-24 RX ADMIN — OFLOXACIN 1 DROP(S): 3 SOLUTION OPHTHALMIC at 11:25

## 2025-02-24 RX ADMIN — OFLOXACIN 1 DROP(S): 3 SOLUTION OPHTHALMIC at 11:30

## 2025-02-24 RX ADMIN — OFLOXACIN 1 DROP(S): 3 SOLUTION OPHTHALMIC at 11:20

## 2025-02-24 NOTE — OPERATIVE REPORT - OPERATIVE RPOSRT DETAILS
DATE OF SURGERY: 2/24/2025    OPERATING SURGEON: Vesta Parra MD    ASSISTANT SURGEON:     PREOPERATIVE DIAGNOSIS: Primary open angle glaucoma, severe stage, left eye    POSTOPERATIVE DIAGNOSIS:  Same    OPERATIVE PROCEDURE: Baerveldt glaucoma implant with patch graft, left eye     ANESTHESIA: Mac/Local    COMPLICATION: None    IMPLANTS:  Baerveldt Glaucoma Implant Serial Number: [id number]  Patch Graft Serial Number: [id number]    PROCEDURE:      Prior to the procedure, all risks, benefits and alternatives were discussed with the patient, including but not limited to infection, bleeding, inflammation, retinal swelling, retinal detachment, increase or decrease in intraocular pressure, corneal edema, ptosis, diplopia, decreased or loss of vision, no improvement of vision, need for glasses, loss of eye, need for additional surgery, floaters, and corneal edema and/or failure.  All questions were answered and the patient wished to proceed with the surgery.    The patient was escorted to the operating room and placed on the operating room table in a supine position.  The operative eye was prepped and draped in the usual sterile fashion for intraocular surgery.  An eyelid speculum was placed.  Tetracaine was placed for topical anesthesia.    A 6-0 Vicryl traction suture was placed. A superotemporal peritomy was then created with a Deborah scissor.  Preservative free lidocaine 1% on a 30-gauge cannula was then injected into the subconjunctival area to achieve anesthesia.   The area was carefully dissected posteriorly. A Baerveldt glaucoma implant was inspected and balanced salt solution was injected through the tube to ensure its patency.  The tube was ligated with a 7-0 Vicryl suture close to the plate and tested to ensure no flow.  The Baerveldt glaucoma implant was then placed into superotemporal quadrant approximately 8 mm from the limbus beneath the superior and lateral rectus muscles. Once it was noted to be in place, it was sutured into the sclera with interrupted 8-0 nylon sutures.    A 23-gauge needle on Healon was then placed approximately 1.5 mm posterior to the limbus along with the plane of the iris and then into the anterior chamber, Healon was injected into the anterior chamber. The Baerveldt tube was placed into the anterior chamber after it had been trimmed to the approximate length, bevel up.  The tube was noted to be resting deep into the anterior chamber just above the iris plane. A 10-0 nylon suture was placed to anchor the tube to the sclera. A patch graft was then placed over the tube entry side and anchored into position with two interrupted 8-0 Vicryl sutures.  A 10-0 Nylon suture was passed anterior to the ligating suture to fenestrate the tube, flow was noted over the fenestration site.     The conjunctiva was then brought over the glaucoma implant, reapproximated to the limbus and sutured into place with interrupted 8-0 Vicryl sutures.     The anterior chamber was noted to be deep and the tube was noted to be off the iris plane.  All wounds were then tested and inspected and were found to be watertight.  A subconjunctival injection of Ancef and Dexamethasone were given. Topical antibiotics and steroids ointment was placed on to the eye.  The eyelid speculum was removed and the eye was shielded.  The patient was wheeled to the recovery room in stable and excellent condition.

## 2025-02-25 ENCOUNTER — NON-APPOINTMENT (OUTPATIENT)
Age: 70
End: 2025-02-25

## 2025-02-25 ENCOUNTER — APPOINTMENT (OUTPATIENT)
Dept: OPHTHALMOLOGY | Facility: CLINIC | Age: 70
End: 2025-02-25
Payer: MEDICARE

## 2025-02-25 PROBLEM — R91.1 SOLITARY PULMONARY NODULE: Chronic | Status: ACTIVE | Noted: 2025-02-24

## 2025-02-25 PROBLEM — K27.9 PEPTIC ULCER, SITE UNSPECIFIED, UNSPECIFIED AS ACUTE OR CHRONIC, WITHOUT HEMORRHAGE OR PERFORATION: Chronic | Status: ACTIVE | Noted: 2025-02-24

## 2025-02-25 PROCEDURE — 99024 POSTOP FOLLOW-UP VISIT: CPT

## 2025-03-11 NOTE — ASU PATIENT PROFILE, ADULT - NSICDXPASTMEDICALHX_GEN_ALL_CORE_FT
PAST MEDICAL HISTORY:  Enlarged prostate     H pylori ulcer     HTN (hypertension)     Hyperlipidemia     Lung nodule     PUD (peptic ulcer disease)

## 2025-03-11 NOTE — ASU PATIENT PROFILE, ADULT - NSICDXPASTSURGICALHX_GEN_ALL_CORE_FT
PAST SURGICAL HISTORY:  Cataract, right eye     H/O resection of stomach     History of trabeculectomy left eye    Status post corneal transplant X2    Status post glaucoma surgery left

## 2025-03-11 NOTE — ASU PATIENT PROFILE, ADULT - FALL HARM RISK - UNIVERSAL INTERVENTIONS
Bed in lowest position, wheels locked, appropriate side rails in place/Call bell, personal items and telephone in reach/Instruct patient to call for assistance before getting out of bed or chair/Non-slip footwear when patient is out of bed/[place] to call system/Physically safe environment - no spills, clutter or unnecessary equipment/Purposeful Proactive Rounding/Room/bathroom lighting operational, light cord in reach

## 2025-03-11 NOTE — ASU PATIENT PROFILE, ADULT - NS PREOP UNDERSTANDS INFO
No solid food/dairy/candy/gum after midnight; water allowed before 07:00am tomorrow; patient reminded to come with photo ID/insurance/credit card; dress in comfortable clothes; no jewelries/contact lens/valuable; no smoking/alcohol drinking/recreational drug use today; escort to have photo ID; address and callback number was given/yes

## 2025-03-12 ENCOUNTER — RESULT REVIEW (OUTPATIENT)
Age: 70
End: 2025-03-12

## 2025-03-12 ENCOUNTER — TRANSCRIPTION ENCOUNTER (OUTPATIENT)
Age: 70
End: 2025-03-12

## 2025-03-12 ENCOUNTER — APPOINTMENT (OUTPATIENT)
Dept: UROLOGY | Facility: AMBULATORY SURGERY CENTER | Age: 70
End: 2025-03-12

## 2025-03-12 ENCOUNTER — OUTPATIENT (OUTPATIENT)
Dept: OUTPATIENT SERVICES | Facility: HOSPITAL | Age: 70
LOS: 1 days | Discharge: HOME | End: 2025-03-12
Payer: MEDICARE

## 2025-03-12 VITALS
OXYGEN SATURATION: 98 % | RESPIRATION RATE: 14 BRPM | HEART RATE: 72 BPM | SYSTOLIC BLOOD PRESSURE: 129 MMHG | DIASTOLIC BLOOD PRESSURE: 78 MMHG

## 2025-03-12 VITALS
OXYGEN SATURATION: 99 % | SYSTOLIC BLOOD PRESSURE: 146 MMHG | HEIGHT: 66 IN | WEIGHT: 151.46 LBS | TEMPERATURE: 98 F | DIASTOLIC BLOOD PRESSURE: 92 MMHG | RESPIRATION RATE: 16 BRPM | HEART RATE: 62 BPM

## 2025-03-12 DIAGNOSIS — Z94.7 CORNEAL TRANSPLANT STATUS: Chronic | ICD-10-CM

## 2025-03-12 DIAGNOSIS — Z98.890 OTHER SPECIFIED POSTPROCEDURAL STATES: Chronic | ICD-10-CM

## 2025-03-12 DIAGNOSIS — Z98.83 FILTERING (VITREOUS) BLEB AFTER GLAUCOMA SURGERY STATUS: Chronic | ICD-10-CM

## 2025-03-12 DIAGNOSIS — H26.9 UNSPECIFIED CATARACT: Chronic | ICD-10-CM

## 2025-03-12 DIAGNOSIS — Z90.3 ACQUIRED ABSENCE OF STOMACH [PART OF]: Chronic | ICD-10-CM

## 2025-03-12 PROCEDURE — 88344 IMHCHEM/IMCYTCHM EA MLT ANTB: CPT | Mod: 26

## 2025-03-12 PROCEDURE — 76999F: CUSTOM | Mod: 26

## 2025-03-12 PROCEDURE — G0416: CPT | Mod: 26

## 2025-03-12 PROCEDURE — 55700: CPT

## 2025-03-12 RX ORDER — SODIUM CHLORIDE 9 G/1000ML
1000 INJECTION, SOLUTION INTRAVENOUS
Refills: 0 | Status: DISCONTINUED | OUTPATIENT
Start: 2025-03-12 | End: 2025-03-12

## 2025-03-12 RX ORDER — FENTANYL CITRATE-0.9 % NACL/PF 100MCG/2ML
25 SYRINGE (ML) INTRAVENOUS
Refills: 0 | Status: DISCONTINUED | OUTPATIENT
Start: 2025-03-12 | End: 2025-03-12

## 2025-03-12 NOTE — ASU DISCHARGE PLAN (ADULT/PEDIATRIC) - ASU DC SPECIAL INSTRUCTIONSFT
PROSTATE BIOPSY    **PLEASE REFER TO DR. LIVE' PRINTED INSTRUCTIONS SHEET**    GENERAL: Expect blood in the urine, stool, and ejaculate for up to two (2) months postoperatively. This is normal and to be expected after this procedure. Increase hydration to keep the urine as clear as possible.    SURGICAL WOUND: There are often lumps and bumps that can be felt in the perineum (skin between scrotum and anus) for up to two (2) months or more post operatively. These are of no concern and with time they will soften and disappear.  Any “black and blue” bruising areas will also resolve.  You may apply an ice-pack for 15 minutes out of every hour for the first 24 -36 hours to minimize pain and swelling. You may apply over the counter Bacitracin to the wound several times a day, and keep covered with over the counter gauze as necessary.    PAIN: You may have some intermittent pain for up to six (6) weeks post operatively. Pain does not signify any problem unless associated with fever, chills, or inability to void.  If you experience any fevers or chills please call immediately as this may be signs of an infection. You may take Tylenol (acetaminophen) 650-975mg and/or Motrin (ibuprofen) 400-600mg, both available over the counter, for pain every 6 hours as needed. Do not exceed 4000mg of Tylenol (acetaminophen) daily. You may alternate these medications such that you take one or the other every 3 hours for around the clock pain coverage.    BATHING: You may shower with soap and water, and pat dry the needle insertion sites in the perineum. Avoid sitting in water for prolonged periods of time for the next few days.    DIET: You may resume your regular diet and regular medication regimen.    ACTIVITY: No heavy lifting or strenuous exercise until you are evaluated at your post-operative appointment. Otherwise, you may return to your usual level of physical activity.    FOLLOW-UP: If you did not already schedule your post-operative appointment, please call your urologist to schedule and follow-up appointment.    CALL YOUR UROLOGIST IF: You have any bleeding that does not stop, inability to void >8 hours, fever over 100.4 F, chills, persistent nausea/vomiting, changes in your incision concerning for infection, or if your pain is not controlled on your discharge pain medications.

## 2025-03-12 NOTE — ASU DISCHARGE PLAN (ADULT/PEDIATRIC) - CARE PROVIDER_API CALL
Liberty Driscoll   Urology  [address] [place] [id number]  Phone: [phone]  Fax: [phone]  Follow Up Time: Routine

## 2025-03-12 NOTE — ASU DISCHARGE PLAN (ADULT/PEDIATRIC) - FINANCIAL ASSISTANCE
[facility] provides services at a reduced cost to those who are determined to be eligible through [facility]’s financial assistance program. Information regarding [facility]’s financial assistance program can be found by going to [email or web address] or by calling [phone].

## 2025-03-14 ENCOUNTER — NON-APPOINTMENT (OUTPATIENT)
Age: 70
End: 2025-03-14

## 2025-03-14 ENCOUNTER — APPOINTMENT (OUTPATIENT)
Dept: OPHTHALMOLOGY | Facility: CLINIC | Age: 70
End: 2025-03-14
Payer: MEDICARE

## 2025-03-14 PROCEDURE — 99024 POSTOP FOLLOW-UP VISIT: CPT

## 2025-04-08 ENCOUNTER — NON-APPOINTMENT (OUTPATIENT)
Age: 70
End: 2025-04-08

## 2025-04-08 ENCOUNTER — APPOINTMENT (OUTPATIENT)
Dept: OPHTHALMOLOGY | Facility: CLINIC | Age: 70
End: 2025-04-08
Payer: MEDICARE

## 2025-04-08 PROCEDURE — 99024 POSTOP FOLLOW-UP VISIT: CPT

## 2025-05-13 ENCOUNTER — APPOINTMENT (OUTPATIENT)
Dept: OPHTHALMOLOGY | Facility: CLINIC | Age: 70
End: 2025-05-13
Payer: MEDICARE

## 2025-05-13 ENCOUNTER — NON-APPOINTMENT (OUTPATIENT)
Age: 70
End: 2025-05-13

## 2025-05-13 PROCEDURE — 92133 CPTRZD OPH DX IMG PST SGM ON: CPT

## 2025-05-13 PROCEDURE — 92083 EXTENDED VISUAL FIELD XM: CPT

## 2025-05-13 PROCEDURE — 99024 POSTOP FOLLOW-UP VISIT: CPT

## 2025-07-08 ENCOUNTER — NON-APPOINTMENT (OUTPATIENT)
Age: 70
End: 2025-07-08

## 2025-07-08 ENCOUNTER — APPOINTMENT (OUTPATIENT)
Dept: OPHTHALMOLOGY | Facility: CLINIC | Age: 70
End: 2025-07-08
Payer: MEDICARE

## 2025-07-08 PROCEDURE — 92012 INTRM OPH EXAM EST PATIENT: CPT

## 2025-09-12 ENCOUNTER — APPOINTMENT (OUTPATIENT)
Dept: OPHTHALMOLOGY | Facility: CLINIC | Age: 70
End: 2025-09-12
Payer: MEDICARE

## 2025-09-12 ENCOUNTER — NON-APPOINTMENT (OUTPATIENT)
Age: 70
End: 2025-09-12

## 2025-09-12 PROCEDURE — 92012 INTRM OPH EXAM EST PATIENT: CPT

## (undated) DEVICE — KNIFE ALCON CRESCENT ANGLED BEVEL UP 2.3MM (PINK)

## (undated) DEVICE — ERASER HEMOSTATIC FINE PT TAPERED 23G

## (undated) DEVICE — ELCTR ERASER BI-P BVL 45DEG 18G

## (undated) DEVICE — PETRI DISH MED 3.5"

## (undated) DEVICE — GRID BRACHYTHERAPY EZ 18G

## (undated) DEVICE — SYR LUER LOK 1CC

## (undated) DEVICE — VENODYNE/SCD SLEEVE CALF MEDIUM

## (undated) DEVICE — DRAPE MICROSCOPE KNOB COVER SMALL (2 PCS)

## (undated) DEVICE — CANNULA IRR ANT CHAMBER 30G

## (undated) DEVICE — KNIFE FULL HANDLE ANGLE 2.75MM

## (undated) DEVICE — WARMING BLANKET LOWER ADULT

## (undated) DEVICE — INSTRUMENT WIPE CORNEAL SHIELD LITE

## (undated) DEVICE — SUT ETHILON 10-0 12" CS160-6

## (undated) DEVICE — PREP BETADINE SOLUTION 5% OPTHALMIC

## (undated) DEVICE — BALLOON ENDOCAVITY 2X14CM

## (undated) DEVICE — SUT VICRYL 8-0 5" BV130-5

## (undated) DEVICE — DRSG PAD EYE OVAL

## (undated) DEVICE — SUT PROLENE 3-0 36" RB-1 HS-5

## (undated) DEVICE — APPLICATOR COTTON TIP 3" STERILE

## (undated) DEVICE — GLV 6.5 PROTEXIS (WHITE)

## (undated) DEVICE — NDL HYPO REGULAR BEVEL 25G X 1.5" (BLUE)

## (undated) DEVICE — GLV 6 PROTEXIS (WHITE)

## (undated) DEVICE — NDL FILTER TW NOKOR 19GA 1.5"

## (undated) DEVICE — SUT SILK 7-0 18" TG140-8

## (undated) DEVICE — SPEAR SURG EYE WECK-CELL CELOS

## (undated) DEVICE — NDL HYPO NONSAFE 30G X 0.5" (BEIGE)

## (undated) DEVICE — NDL HYPO REGULAR BEVEL 30G X .5"

## (undated) DEVICE — SUT VICRYL 7-0 18" TG140-8 DA

## (undated) DEVICE — DRSG TELFA 3 X 8

## (undated) DEVICE — SYR LUER LOK 50CC

## (undated) DEVICE — ELCTR BIPOLAR CORD 12FT

## (undated) DEVICE — SUT ETHILON 8-0 12" TG100-8

## (undated) DEVICE — KNIFE ALCON PARACENTESIS CLEARCUT SIDEPORT 1MM (YELLOW)

## (undated) DEVICE — PACK ANTERIOR SEGMENT

## (undated) DEVICE — SUT ETHILON 10-0 5" BV75-3

## (undated) DEVICE — GLV 7.5 PROTEXIS (WHITE)

## (undated) DEVICE — NDL MAX CORE 18G X 25CM

## (undated) DEVICE — BEAVER BLADE MIN-BLADE ROUNDED TIP 1-SIDE SHARP (GREEN)

## (undated) DEVICE — KNIFE ALCON STANDARD FULL HANDLE 15 DEG (PINK)

## (undated) DEVICE — PLASTIC SOLUTION BOWL 160Z

## (undated) DEVICE — KNIFE ALCON MVR V-LANCE 20G (WHITE)

## (undated) DEVICE — PREP CHLORAPREP HI-LITE ORANGE 26ML

## (undated) DEVICE — SUT VICRYL 8-0 12" TG140-6

## (undated) DEVICE — SUT NYLON 9-0 12" CU-5

## (undated) DEVICE — ELCTR BIPOLAR CORD J&J 12FT DISP

## (undated) DEVICE — NDL BIOPSY CHIBA 22G X 20CM

## (undated) DEVICE — SUT VICRYL 6-0 18" S-29 DA

## (undated) DEVICE — GOWN ROYAL SILK XL

## (undated) DEVICE — SPEAR CELLULOSE 40410

## (undated) DEVICE — DRAPE MAYO STAND 23"

## (undated) DEVICE — SYR TOOMEY 50ML

## (undated) DEVICE — DRAPE TOWEL BLUE 17" X 24"

## (undated) DEVICE — Device

## (undated) DEVICE — SUT SILK 6-0 18" TG140-8

## (undated) DEVICE — DRSG STERISTRIPS 0.5 X 4"

## (undated) DEVICE — WARMING BLANKET UPPER ADULT

## (undated) DEVICE — DRAPE MEDIUM SHEET 44" X 70"

## (undated) DEVICE — SYR LUER LOK 10CC